# Patient Record
Sex: FEMALE | Race: WHITE | NOT HISPANIC OR LATINO | Employment: FULL TIME | URBAN - METROPOLITAN AREA
[De-identification: names, ages, dates, MRNs, and addresses within clinical notes are randomized per-mention and may not be internally consistent; named-entity substitution may affect disease eponyms.]

---

## 2020-04-13 ENCOUNTER — TELEMEDICINE (OUTPATIENT)
Dept: FAMILY MEDICINE CLINIC | Facility: TELEHEALTH | Age: 55
End: 2020-04-13

## 2020-04-13 ENCOUNTER — PATIENT MESSAGE (OUTPATIENT)
Dept: FAMILY MEDICINE CLINIC | Facility: CLINIC | Age: 55
End: 2020-04-13

## 2020-04-13 DIAGNOSIS — N30.00 ACUTE CYSTITIS WITHOUT HEMATURIA: Primary | ICD-10-CM

## 2020-04-13 DIAGNOSIS — N39.0 ACUTE UTI (URINARY TRACT INFECTION): ICD-10-CM

## 2020-04-13 PROCEDURE — 99213 OFFICE O/P EST LOW 20 MIN: CPT | Performed by: NURSE PRACTITIONER

## 2020-04-13 RX ORDER — NITROFURANTOIN 25; 75 MG/1; MG/1
100 CAPSULE ORAL 2 TIMES DAILY
Qty: 14 CAPSULE | Refills: 0 | Status: SHIPPED | OUTPATIENT
Start: 2020-04-13 | End: 2020-04-13 | Stop reason: SDUPTHER

## 2020-04-13 RX ORDER — NITROFURANTOIN 25; 75 MG/1; MG/1
100 CAPSULE ORAL 2 TIMES DAILY
Qty: 14 CAPSULE | Refills: 0 | Status: SHIPPED | OUTPATIENT
Start: 2020-04-13 | End: 2021-07-08

## 2020-04-13 NOTE — PATIENT INSTRUCTIONS

## 2021-07-14 ENCOUNTER — TELEPHONE (OUTPATIENT)
Dept: URGENT CARE | Facility: CLINIC | Age: 56
End: 2021-07-14

## 2021-07-14 NOTE — TELEPHONE ENCOUNTER
Based on urine culture and sensitivity, ceftin is intermediate susceptibility. Will change to nitrofurantoin. Please call patient for pharmacy details. There is none listed.

## 2021-12-06 ENCOUNTER — TELEMEDICINE (OUTPATIENT)
Dept: SLEEP MEDICINE | Facility: CLINIC | Age: 56
End: 2021-12-06

## 2021-12-06 DIAGNOSIS — R06.83 SNORING: Primary | ICD-10-CM

## 2021-12-06 DIAGNOSIS — E66.09 CLASS 2 OBESITY DUE TO EXCESS CALORIES WITHOUT SERIOUS COMORBIDITY WITH BODY MASS INDEX (BMI) OF 35.0 TO 35.9 IN ADULT: ICD-10-CM

## 2021-12-06 DIAGNOSIS — G47.33 OBSTRUCTIVE SLEEP APNEA, ADULT: ICD-10-CM

## 2021-12-06 PROCEDURE — 99203 OFFICE O/P NEW LOW 30 MIN: CPT | Performed by: INTERNAL MEDICINE

## 2021-12-14 ENCOUNTER — HOSPITAL ENCOUNTER (OUTPATIENT)
Dept: SLEEP MEDICINE | Facility: HOSPITAL | Age: 56
Discharge: HOME | End: 2021-12-14
Admitting: INTERNAL MEDICINE

## 2021-12-14 DIAGNOSIS — G47.33 OBSTRUCTIVE SLEEP APNEA, ADULT: ICD-10-CM

## 2021-12-14 DIAGNOSIS — R06.83 SNORING: ICD-10-CM

## 2021-12-14 PROCEDURE — 95810 POLYSOM 6/> YRS 4/> PARAM: CPT

## 2021-12-14 PROCEDURE — 95810 POLYSOM 6/> YRS 4/> PARAM: CPT | Performed by: INTERNAL MEDICINE

## 2021-12-24 NOTE — PROGRESS NOTES
Chief Complaint  Snoring and nonrestorative sleep.    Subjective       Michelle Proctor presents to [facility] for the evaluation of snoring and nonrestorative sleep.  She is referred by Dr. Luz Paulino.  You have chosen to receive care through a telehealth visit.  Do you consent to use a video/audio connection for your medical care today? Yes  History of Present Illness  Patient complains of daytime sleepiness and says she feels as though her throat closes when she is sleeping on her back.  She has a history of snoring.  She had a retinal evaluation that showed changes suggestive of obstructive sleep apnea.  She occasionally awakens gasping for breath.  She says she usually sleeps on her side.  She denies morning headaches.    She will awaken with a dry mouth.  She denies ever breaking her nose.  She does have nasal allergies.  She will occasionally fall asleep with sitting quietly during the day.  She denies problems driving.  She denies kicking or jerking her legs at night.  She denies any chronic pain that keeps her awake.    She goes to bed between 11 PM and midnight.  She will fall asleep quickly.  She awakens once or twice during the night.  She gets 7 to 8 hours of sleep and says she is occasionally rested.  She does admit to feeling tired later in the day.  She denies any history of hypertension, diabetes, or coronary artery disease.    Allergies: She says she has history of hayfever    Habits: Smoking: Without    Ethanol: She has 1 drink 2-4 times per month    Caffeine: She has 3 to 4 cups of coffee each day and 1 serving of tea    Medical illnesses: She has had some retinal changes and has a history of retinal detachment    Medications:  Bacillus Coagulans-Inulin (Probiotic) 1-250 BILLION-MG capsule Oral, Daily         Borage, Borago officinalis, 1000 MG capsule Oral        cefuroxime (CEFTIN) 500 MG tablet 500 mg, Oral, 2 Times Daily        Patient Requested  Removal DiscontinueKeep Active  Patient comments (entered 12/6/2021): finished prescription       etonogestrel-ethinyl estradiol (NuvaRing) 0.12-0.015 MG/24HR vaginal ring Vaginal        Patient Requested Removal DiscontinueKeep Active       EVENING PRIMROSE OIL PO Oral        loratadine (Claritin) 10 MG tablet Oral, Daily        Omega-3 Fatty Acids (fish oil) 1000 MG capsule capsule 2 capsule, Oral, Daily         Surgeries: She had wisdom teeth extraction scleral buckle and vitrectomy    Family history: Said to be unremarkable    Review of systems: Positive for detached retina otherwise systems are negative.    [place] score is 14/24    Objective   Vital Signs:   There were no vitals taken for this visit.    Physical Exam patient appears to be awake and alert.  She does not appear to be in acute respiratory distress.  Her stated weight is 250 pounds.  Her height 70 inches.  Body mass index 34  Result Review :           Assessment and Plan   Diagnoses and all orders for this visit:    1. Snoring (Primary)  -     Polysomnography 4 or More Parameters; Future  -     Home Sleep Study; Future    2. Obstructive sleep apnea, adult  -     Polysomnography 4 or More Parameters; Future  -     Home Sleep Study; Future    3. Class 2 obesity due to excess calories without serious comorbidity with body mass index (BMI) of 35.0 to 35.9 in adult    Patient has a history of snoring and nonrestorative sleep.  She gives a very good story for obstructive sleep apnea.  We will plan proceed to polysomnogram.  We have discussed possible therapies including CPAP, weight control, oral appliance, and surgery.  We have also discussed the long-term consequences of untreated obstructive sleep apnea.  These include hypertension, diabetes, heart disease, stroke, and dementia.  She is encouraged to lose weight.  She is encouraged avoid alcohol and sedatives close to bedtime.  She is encouraged to practice lateral position of sleep.  We will plan to  see her back after her study.  I spent 35 minutes caring for Michelle on this date of service. This time includes time spent by me in the following activities:obtaining and/or reviewing a separately obtained history, performing a medically appropriate examination and/or evaluation , counseling and educating the patient/family/caregiver, ordering medications, tests, or procedures and documenting information in the medical record  Follow Up   Return in about 2 weeks (around 12/20/2021) for Follow up after study, Next scheduled follow-up, Video visit.  Patient was given instructions and counseling regarding her condition or for health maintenance advice. Please see specific information pulled into the AVS if appropriate.   James Burgos MD [facility]  Sleep Medicine  Pulmonary and Critical Care Medicine

## 2022-01-03 ENCOUNTER — OFFICE VISIT (OUTPATIENT)
Dept: SLEEP MEDICINE | Facility: CLINIC | Age: 57
End: 2022-01-03

## 2022-01-03 VITALS
HEART RATE: 72 BPM | BODY MASS INDEX: 35.93 KG/M2 | OXYGEN SATURATION: 96 % | HEIGHT: 70 IN | SYSTOLIC BLOOD PRESSURE: 144 MMHG | DIASTOLIC BLOOD PRESSURE: 94 MMHG | WEIGHT: 251 LBS

## 2022-01-03 DIAGNOSIS — E66.09 CLASS 2 OBESITY DUE TO EXCESS CALORIES WITHOUT SERIOUS COMORBIDITY WITH BODY MASS INDEX (BMI) OF 36.0 TO 36.9 IN ADULT: ICD-10-CM

## 2022-01-03 DIAGNOSIS — R06.83 SNORING: Primary | ICD-10-CM

## 2022-01-03 DIAGNOSIS — G47.34 NOCTURNAL HYPOXEMIA: ICD-10-CM

## 2022-01-03 PROCEDURE — 99213 OFFICE O/P EST LOW 20 MIN: CPT | Performed by: INTERNAL MEDICINE

## 2022-01-03 RX ORDER — DORZOLAMIDE HYDROCHLORIDE AND TIMOLOL MALEATE 20; 5 MG/ML; MG/ML
SOLUTION/ DROPS OPHTHALMIC
COMMUNITY
Start: 2021-10-08

## 2022-01-03 RX ORDER — ANTIOX #8/OM3/DHA/EPA/LUT/ZEAX 250-2.5 MG
1 CAPSULE ORAL
COMMUNITY

## 2022-01-03 NOTE — PATIENT INSTRUCTIONS
"171, P1-P2. Online version updated July 2020.Retrieved from [email or web address]">   Hypoxemia    Hypoxemia happens when the blood does not have enough oxygen in it. Every part of the body needs oxygen to work well. Oxygen enters the lungs when a person breathes in and then it travels to all parts of the body through the blood.  Hypoxemia can develop suddenly or slowly and can be mild to severe.  What are the causes?  Causes of hypoxemia may include:  Lung conditions. These may include:  Long-term (chronic) lung disease, such as:  Asthma.  Chronic obstructive pulmonary disease (COPD).  Interstitial lung disease.  Problems that affect breathing at night, such as sleep apnea.  Fluid buildup in the lungs.  Lung infection (pneumonia).  Lung or throat cancer.  A collapsed lung.  Heart or blood vessel (vascular) conditions, such as:  A blood clot in the lungs (pulmonary embolus).  Certain types of heart disease.  Other causes may include:  Certain diseases that affect nerves or muscles.  Slow or shallow breathing due to being very overweight (obesity hypoventilation).  High altitudes, as there is less oxygen in the air.  Toxic chemicals, smoke, and gases.  What are the signs or symptoms?  In some cases, there may be no symptoms of this condition. If you do have symptoms, they may include:  Shortness of breath.  Breathing that is fast, noisy, or shallow.  Bluish color of the skin, lips, or nail beds.  A fast heartbeat.  Feeling tired or sleepy.  Feeling confused or agitated.  If hypoxemia develops quickly, it is likely you will suddenly have trouble breathing. If hypoxemia develops slowly over months or years, you may not notice any symptoms.  How is this diagnosed?  This condition is diagnosed by:  A physical exam.  A blood test that measures the amount of oxygen in your blood.  A test that measures the percentage of oxygen in your blood (pulse oximetry). This is " done by placing a sensor on your finger, toe, or earlobe.  How is this treated?  Treatment for this condition depends on the cause or the severity of your hypoxemia.  You will likely be treated with oxygen therapy to restore your blood oxygen level.  You may need oxygen therapy for a short time, such as weeks or months, or you may need it for the rest of your life.  You may be asked to lay on your stomach (prone). This may help bring your oxygen level up or help you feel less short of breath.  Your health care provider may also recommend other therapies to treat the underlying cause of your hypoxemia.  Follow these instructions at home:    Take over-the-counter and prescription medicines only as told by your health care provider.  If you are on oxygen therapy, follow oxygen safety precautions as directed by your health care provider. Precautions may include:  Always have a backup supply of oxygen.  Do not let anyone smoke or have a fire near your oxygen supply.  Handle oxygen tanks carefully as told by your health care provider.  Do not use any products that contain nicotine or tobacco, such as cigarettes, e-cigarettes, and chewing tobacco. If you need help quitting, ask your health care provider. Stay away from people who smoke.  Keep all follow-up visits as told by your health care provider. This is important.  Contact a health care provider if:  You have any concerns about your oxygen therapy.  You have trouble breathing, even while wearing an oxygen supply.  You become short of breath when you exercise.  You are still tired or have a headache when you wake up.  Get help right away if:  Your shortness of breath gets worse, especially with normal activity or only a little bit of activity.  Your skin, lips, or nail beds are a bluish color.  You become confused or you cannot think properly.  You have chest pain.  You have a fever.  These symptoms may represent a serious problem that is an emergency. Do not wait to  see if the symptoms will go away. Get medical help right away. Call your local emergency services (911 in the [place] Do not drive yourself to the hospital.  Summary  Hypoxemia occurs when the blood does not contain enough oxygen.  Hypoxemia may or may not cause symptoms. Often, the main symptom is shortness of breath..  Depending on the cause of your hypoxemia, you may need oxygen therapy for a short time, such as weeks or months, or you may need it for the rest of your life.  If you are on oxygen therapy, follow oxygen safety precautions as directed by your health care provider.  This information is not intended to replace advice given to you by your health care provider. Make sure you discuss any questions you have with your health care provider.  Document Revised: 01/07/2021 Document Reviewed: 01/07/2021  Elsevier Patient Education © 2021 [employer]  Sleep Apnea  Sleep apnea is a condition in which breathing pauses or becomes shallow during sleep. Episodes of sleep apnea usually last 10 seconds or longer, and they may occur as many as 20 times an hour. Sleep apnea disrupts your sleep and keeps your body from getting the rest that it needs. This condition can increase your risk of certain health problems, including:  · Heart attack.  · Stroke.  · Obesity.  · Diabetes.  · Heart failure.  · Irregular heartbeat.  What are the causes?  There are three kinds of sleep apnea:  · Obstructive sleep apnea. This kind is caused by a blocked or collapsed airway.  · Central sleep apnea. This kind happens when the part of the brain that controls breathing does not send the correct signals to the muscles that control breathing.  · Mixed sleep apnea. This is a combination of obstructive and central sleep apnea.  The most common cause of this condition is a collapsed or blocked airway. An airway can collapse or become blocked if:  · Your throat muscles are abnormally relaxed.  · Your tongue and tonsils are larger than  normal.  · You are overweight.  · Your airway is smaller than normal.  What increases the risk?  You are more likely to develop this condition if you:  · Are overweight.  · Smoke.  · Have a smaller than normal airway.  · Are elderly.  · Are male.  · Drink alcohol.  · Take sedatives or tranquilizers.  · Have a family history of sleep apnea.  What are the signs or symptoms?  Symptoms of this condition include:  · Trouble staying asleep.  · Daytime sleepiness and tiredness.  · Irritability.  · Loud snoring.  · Morning headaches.  · Trouble concentrating.  · Forgetfulness.  · Decreased interest in sex.  · Unexplained sleepiness.  · Mood swings.  · Personality changes.  · Feelings of depression.  · Waking up often during the night to urinate.  · Dry mouth.  · Sore throat.  How is this diagnosed?  This condition may be diagnosed with:  · A medical history.  · A physical exam.  · A series of tests that are done while you are sleeping (sleep study). These tests are usually done in a sleep lab, but they may also be done at home.  How is this treated?  Treatment for this condition aims to restore normal breathing and to ease symptoms during sleep. It may involve managing health issues that can affect breathing, such as high blood pressure or obesity. Treatment may include:  · Sleeping on your side.  · Using a decongestant if you have nasal congestion.  · Avoiding the use of depressants, including alcohol, sedatives, and narcotics.  · Losing weight if you are overweight.  · Making changes to your diet.  · Quitting smoking.  · Using a device to open your airway while you sleep, such as:  ? An oral appliance. This is a custom-made mouthpiece that shifts your lower jaw forward.  ? A continuous positive airway pressure (CPAP) device. This device blows air through a mask when you breathe out (exhale).  ? A nasal expiratory positive airway pressure (EPAP) device. This device has valves that you put into each nostril.  ? A bi-level  positive airway pressure (BPAP) device. This device blows air through a mask when you breathe in (inhale) and breathe out (exhale).  · Having surgery if other treatments do not work. During surgery, excess tissue is removed to create a wider airway.  It is important to get treatment for sleep apnea. Without treatment, this condition can lead to:  · High blood pressure.  · Coronary artery disease.  · In men, an inability to achieve or maintain an erection (impotence).  · Reduced thinking abilities.  Follow these instructions at home:  Lifestyle  · Make any lifestyle changes that your health care provider recommends.  · Eat a healthy, well-balanced diet.  · Take steps to lose weight if you are overweight.  · Avoid using depressants, including alcohol, sedatives, and narcotics.  · Do not use any products that contain nicotine or tobacco, such as cigarettes, e-cigarettes, and chewing tobacco. If you need help quitting, ask your health care provider.  General instructions  · Take over-the-counter and prescription medicines only as told by your health care provider.  · If you were given a device to open your airway while you sleep, use it only as told by your health care provider.  · If you are having surgery, make sure to tell your health care provider you have sleep apnea. You may need to bring your device with you.  · Keep all follow-up visits as told by your health care provider. This is important.  Contact a health care provider if:  · The device that you received to open your airway during sleep is uncomfortable or does not seem to be working.  · Your symptoms do not improve.  · Your symptoms get worse.  Get help right away if:  · You develop:  ? Chest pain.  ? Shortness of breath.  ? Discomfort in your back, arms, or stomach.  · You have:  ? Trouble speaking.  ? Weakness on one side of your body.  ? Drooping in your face.  These symptoms may represent a serious problem that is an emergency. Do not wait to see if  the symptoms will go away. Get medical help right away. Call your local emergency services (911 in the [place] Do not drive yourself to the hospital.  Summary  · Sleep apnea is a condition in which breathing pauses or becomes shallow during sleep.  · The most common cause is a collapsed or blocked airway.  · The goal of treatment is to restore normal breathing and to ease symptoms during sleep.  This information is not intended to replace advice given to you by your health care provider. Make sure you discuss any questions you have with your health care provider.  Document Revised: 06/03/2020 Document Reviewed: 08/13/2019  Elsevier Patient Education © 2021 [employer] Inc.

## 2022-01-19 NOTE — PROGRESS NOTES
"Chief Complaint  Follow-up snoring nonrestorative sleep    Subjective        Michelle Proctor presents to [facility] for the evaluation of snoring and nonrestorative sleep.  Her primary care provider is Dr. Paulino.  She is seen in person in the sleep clinic.  History of Present Illness  Patient was last seen in clinic on December 6.  She has a history of snoring and nonrestorative sleep.  She had been noted to have retinal changes on an ophthalmologic examination.  She she says she has been sleeping about the same.  She is unsure if she has snoring.  She denies any real changes in her health status.  She still complains of being somewhat sleepy during the day.    She thought her study night was fairly usual.  She says she usually sleeps on 2 pillows at home.  She sometimes feels as though her throat is closing however she says.    Review of systems: Positive for detached retina otherwise systems are negative.    [place] score is 13/24  Objective   Vital Signs:   /94 (BP Location: Left arm, Patient Position: Sitting, Cuff Size: Adult)   Pulse 72   Ht 177.8 cm (70\")   Wt 114 kg (251 lb)   SpO2 96%   BMI 36.01 kg/m²     Physical Exam patient appears to be awake and alert.  She does not appear to be in acute respiratory distress.    She is normocephalic.  She has Mallampati class III anatomy.    Lungs are clear.    Cardiac exam feel normal S1-S2.    Extremities showed no edema.  Result Review :    Her polysomnogram showed good sleep efficiency of 87.7%.  She had a normal percentage REM sleep at 21%.  Her AHI is at the upper limits of normal at 4.8.  She spent 9.5 minutes in the desaturated state.  She did have some scattered snoring     Assessment and Plan    Diagnoses and all orders for this visit:    1. Snoring (Primary)  -      Mandibular Advancement Device (custom fabricated)  -     Ambulatory Referral to Dentistry    2. Class 2 obesity due to excess " calories without serious comorbidity with body mass index (BMI) of 36.0 to 36.9 in adult    3. Nocturnal hypoxemia    Patient does have some snoring but did not have enough events to make a diagnosis of obstructive sleep apnea.  She is to discuss with her dentist possibly getting an oral appliance for snoring.  She is to work on her weight but declines referral to a dietitian.  She is to practice strict lateral position sleep.  She does have mild nocturnal hypoxemia.  She declines supplemental oxygen therapy at this time.  We will plan to see her back in roughly 6 months.  She is to contact us earlier symptoms worsen.  I spent 25 minutes caring for Michelle on this date of service. This time includes time spent by me in the following activities:obtaining and/or reviewing a separately obtained history, performing a medically appropriate examination and/or evaluation , counseling and educating the patient/family/caregiver, ordering medications, tests, or procedures and documenting information in the medical record  Follow Up   She is to be seen in follow-up in 6 months  Patient was given instructions and counseling regarding her condition or for health maintenance advice. Please see specific information pulled into the AVS if appropriate.     James Burgos MD [facility]  Sleep Medicine  Pulmonary and Critical Care Medicine

## 2023-04-05 ENCOUNTER — LAB (OUTPATIENT)
Dept: LAB | Facility: HOSPITAL | Age: 58
End: 2023-04-05
Payer: COMMERCIAL

## 2023-04-05 ENCOUNTER — TRANSCRIBE ORDERS (OUTPATIENT)
Dept: LAB | Facility: HOSPITAL | Age: 58
End: 2023-04-05
Payer: COMMERCIAL

## 2023-04-05 DIAGNOSIS — Z13.220 SCREENING FOR LIPOID DISORDERS: ICD-10-CM

## 2023-04-05 DIAGNOSIS — Z13.29 SCREENING FOR THYROID DISORDER: ICD-10-CM

## 2023-04-05 DIAGNOSIS — E55.9 AVITAMINOSIS D: Primary | ICD-10-CM

## 2023-04-05 DIAGNOSIS — E55.9 AVITAMINOSIS D: ICD-10-CM

## 2023-04-05 LAB
25(OH)D3 SERPL-MCNC: 29.6 NG/ML (ref 30–100)
ALBUMIN SERPL-MCNC: 4.4 G/DL (ref 3.5–5.2)
ALBUMIN/GLOB SERPL: 1.5 G/DL
ALP SERPL-CCNC: 78 U/L (ref 39–117)
ALT SERPL W P-5'-P-CCNC: 11 U/L (ref 1–33)
ANION GAP SERPL CALCULATED.3IONS-SCNC: 11 MMOL/L (ref 5–15)
AST SERPL-CCNC: 17 U/L (ref 1–32)
BACTERIA UR QL AUTO: ABNORMAL /HPF
BASOPHILS # BLD AUTO: 0.05 10*3/MM3 (ref 0–0.2)
BASOPHILS NFR BLD AUTO: 1.2 % (ref 0–1.5)
BILIRUB SERPL-MCNC: 0.5 MG/DL (ref 0–1.2)
BILIRUB UR QL STRIP: NEGATIVE
BUN SERPL-MCNC: 17 MG/DL (ref 6–20)
BUN/CREAT SERPL: 18.7 (ref 7–25)
CALCIUM SPEC-SCNC: 9.4 MG/DL (ref 8.6–10.5)
CHLORIDE SERPL-SCNC: 105 MMOL/L (ref 98–107)
CHOLEST SERPL-MCNC: 187 MG/DL (ref 0–200)
CLARITY UR: CLEAR
CO2 SERPL-SCNC: 22 MMOL/L (ref 22–29)
COLOR UR: YELLOW
CREAT SERPL-MCNC: 0.91 MG/DL (ref 0.57–1)
DEPRECATED RDW RBC AUTO: 43.3 FL (ref 37–54)
EGFRCR SERPLBLD CKD-EPI 2021: 73.7 ML/MIN/1.73
EOSINOPHIL # BLD AUTO: 0.15 10*3/MM3 (ref 0–0.4)
EOSINOPHIL NFR BLD AUTO: 3.7 % (ref 0.3–6.2)
ERYTHROCYTE [DISTWIDTH] IN BLOOD BY AUTOMATED COUNT: 11.9 % (ref 12.3–15.4)
GLOBULIN UR ELPH-MCNC: 2.9 GM/DL
GLUCOSE SERPL-MCNC: 82 MG/DL (ref 65–99)
GLUCOSE UR STRIP-MCNC: NEGATIVE MG/DL
HCT VFR BLD AUTO: 38.9 % (ref 34–46.6)
HDLC SERPL-MCNC: 64 MG/DL (ref 40–60)
HGB BLD-MCNC: 12.6 G/DL (ref 12–15.9)
HGB UR QL STRIP.AUTO: ABNORMAL
HYALINE CASTS UR QL AUTO: ABNORMAL /LPF
IMM GRANULOCYTES # BLD AUTO: 0.01 10*3/MM3 (ref 0–0.05)
IMM GRANULOCYTES NFR BLD AUTO: 0.2 % (ref 0–0.5)
KETONES UR QL STRIP: ABNORMAL
LDLC SERPL CALC-MCNC: 111 MG/DL (ref 0–100)
LDLC/HDLC SERPL: 1.73 {RATIO}
LEUKOCYTE ESTERASE UR QL STRIP.AUTO: ABNORMAL
LYMPHOCYTES # BLD AUTO: 1.56 10*3/MM3 (ref 0.7–3.1)
LYMPHOCYTES NFR BLD AUTO: 38.3 % (ref 19.6–45.3)
MCH RBC QN AUTO: 32.1 PG (ref 26.6–33)
MCHC RBC AUTO-ENTMCNC: 32.4 G/DL (ref 31.5–35.7)
MCV RBC AUTO: 99 FL (ref 79–97)
MONOCYTES # BLD AUTO: 0.41 10*3/MM3 (ref 0.1–0.9)
MONOCYTES NFR BLD AUTO: 10.1 % (ref 5–12)
NEUTROPHILS NFR BLD AUTO: 1.89 10*3/MM3 (ref 1.7–7)
NEUTROPHILS NFR BLD AUTO: 46.5 % (ref 42.7–76)
NITRITE UR QL STRIP: NEGATIVE
NRBC BLD AUTO-RTO: 0 /100 WBC (ref 0–0.2)
PH UR STRIP.AUTO: 5.5 [PH] (ref 5–8)
PLATELET # BLD AUTO: 270 10*3/MM3 (ref 140–450)
PMV BLD AUTO: 11.4 FL (ref 6–12)
POTASSIUM SERPL-SCNC: 4.3 MMOL/L (ref 3.5–5.2)
PROT SERPL-MCNC: 7.3 G/DL (ref 6–8.5)
PROT UR QL STRIP: NEGATIVE
RBC # BLD AUTO: 3.93 10*6/MM3 (ref 3.77–5.28)
RBC # UR STRIP: ABNORMAL /HPF
REF LAB TEST METHOD: ABNORMAL
SODIUM SERPL-SCNC: 138 MMOL/L (ref 136–145)
SP GR UR STRIP: 1.02 (ref 1–1.03)
SQUAMOUS #/AREA URNS HPF: ABNORMAL /HPF
TRIGL SERPL-MCNC: 63 MG/DL (ref 0–150)
TSH SERPL DL<=0.05 MIU/L-ACNC: 3.59 UIU/ML (ref 0.27–4.2)
UROBILINOGEN UR QL STRIP: ABNORMAL
VLDLC SERPL-MCNC: 12 MG/DL (ref 5–40)
WBC # UR STRIP: ABNORMAL /HPF
WBC NRBC COR # BLD: 4.07 10*3/MM3 (ref 3.4–10.8)

## 2023-04-05 PROCEDURE — 80061 LIPID PANEL: CPT

## 2023-04-05 PROCEDURE — 36415 COLL VENOUS BLD VENIPUNCTURE: CPT

## 2023-04-05 PROCEDURE — 82306 VITAMIN D 25 HYDROXY: CPT

## 2023-04-05 PROCEDURE — 80050 GENERAL HEALTH PANEL: CPT

## 2023-04-05 PROCEDURE — 81001 URINALYSIS AUTO W/SCOPE: CPT
